# Patient Record
Sex: MALE | Race: WHITE | Employment: FULL TIME | URBAN - METROPOLITAN AREA
[De-identification: names, ages, dates, MRNs, and addresses within clinical notes are randomized per-mention and may not be internally consistent; named-entity substitution may affect disease eponyms.]

---

## 2020-06-24 ENCOUNTER — HOSPITAL ENCOUNTER (OUTPATIENT)
Dept: PREADMISSION TESTING | Age: 55
Discharge: HOME | End: 2020-06-24
Payer: COMMERCIAL

## 2020-06-24 PROCEDURE — 87635 SARS-COV-2 COVID-19 AMP PRB: CPT

## 2020-06-25 LAB — SARS-COV-2, COV2NT: NOT DETECTED

## 2020-06-29 ENCOUNTER — HOSPITAL ENCOUNTER (OUTPATIENT)
Age: 55
Setting detail: OUTPATIENT SURGERY
Discharge: HOME | End: 2020-06-29
Attending: INTERNAL MEDICINE | Admitting: INTERNAL MEDICINE
Payer: COMMERCIAL

## 2020-06-29 VITALS
TEMPERATURE: 96.3 F | RESPIRATION RATE: 16 BRPM | SYSTOLIC BLOOD PRESSURE: 120 MMHG | WEIGHT: 197.06 LBS | HEIGHT: 73 IN | HEART RATE: 56 BPM | OXYGEN SATURATION: 98 % | BODY MASS INDEX: 26.12 KG/M2 | DIASTOLIC BLOOD PRESSURE: 87 MMHG

## 2020-06-29 PROCEDURE — 76040000008: Performed by: INTERNAL MEDICINE

## 2020-06-29 PROCEDURE — 74011250636 HC RX REV CODE- 250/636: Performed by: INTERNAL MEDICINE

## 2020-06-29 PROCEDURE — 99153 MOD SED SAME PHYS/QHP EA: CPT | Performed by: INTERNAL MEDICINE

## 2020-06-29 PROCEDURE — G0500 MOD SEDAT ENDO SERVICE >5YRS: HCPCS | Performed by: INTERNAL MEDICINE

## 2020-06-29 PROCEDURE — 77030040361 HC SLV COMPR DVT MDII -B: Performed by: INTERNAL MEDICINE

## 2020-06-29 RX ORDER — SODIUM CHLORIDE 9 MG/ML
125 INJECTION, SOLUTION INTRAVENOUS CONTINUOUS
Status: DISCONTINUED | OUTPATIENT
Start: 2020-06-29 | End: 2020-06-29 | Stop reason: HOSPADM

## 2020-06-29 RX ORDER — CHOLECALCIFEROL (VITAMIN D3) 125 MCG
CAPSULE ORAL AS NEEDED
COMMUNITY

## 2020-06-29 RX ORDER — DIPHENHYDRAMINE HYDROCHLORIDE 50 MG/ML
50 INJECTION, SOLUTION INTRAMUSCULAR; INTRAVENOUS ONCE
Status: CANCELLED | OUTPATIENT
Start: 2020-06-29 | End: 2020-06-29

## 2020-06-29 RX ORDER — SODIUM CHLORIDE 0.9 % (FLUSH) 0.9 %
5-40 SYRINGE (ML) INJECTION EVERY 8 HOURS
Status: CANCELLED | OUTPATIENT
Start: 2020-06-29

## 2020-06-29 RX ORDER — DEXTROMETHORPHAN/PSEUDOEPHED 2.5-7.5/.8
1.2 DROPS ORAL
Status: CANCELLED | OUTPATIENT
Start: 2020-06-29

## 2020-06-29 RX ORDER — FLUMAZENIL 0.1 MG/ML
0.2 INJECTION INTRAVENOUS
Status: DISCONTINUED | OUTPATIENT
Start: 2020-06-29 | End: 2020-06-29 | Stop reason: HOSPADM

## 2020-06-29 RX ORDER — ATROPINE SULFATE 0.1 MG/ML
0.5 INJECTION INTRAVENOUS
Status: CANCELLED | OUTPATIENT
Start: 2020-06-29 | End: 2020-06-30

## 2020-06-29 RX ORDER — SODIUM CHLORIDE 0.9 % (FLUSH) 0.9 %
5-40 SYRINGE (ML) INJECTION AS NEEDED
Status: CANCELLED | OUTPATIENT
Start: 2020-06-29

## 2020-06-29 RX ORDER — SODIUM CHLORIDE 9 MG/ML
1000 INJECTION, SOLUTION INTRAVENOUS CONTINUOUS
Status: CANCELLED | OUTPATIENT
Start: 2020-06-29 | End: 2020-06-29

## 2020-06-29 RX ORDER — EPINEPHRINE 0.1 MG/ML
1 INJECTION INTRACARDIAC; INTRAVENOUS
Status: CANCELLED | OUTPATIENT
Start: 2020-06-29 | End: 2020-06-30

## 2020-06-29 RX ORDER — FENTANYL CITRATE 50 UG/ML
100 INJECTION, SOLUTION INTRAMUSCULAR; INTRAVENOUS
Status: DISCONTINUED | OUTPATIENT
Start: 2020-06-29 | End: 2020-06-29 | Stop reason: HOSPADM

## 2020-06-29 RX ORDER — ACETAMINOPHEN 500 MG
500 TABLET ORAL
COMMUNITY

## 2020-06-29 RX ORDER — NALOXONE HYDROCHLORIDE 0.4 MG/ML
0.4 INJECTION, SOLUTION INTRAMUSCULAR; INTRAVENOUS; SUBCUTANEOUS
Status: DISCONTINUED | OUTPATIENT
Start: 2020-06-29 | End: 2020-06-29 | Stop reason: HOSPADM

## 2020-06-29 RX ORDER — MIDAZOLAM HYDROCHLORIDE 1 MG/ML
.25-5 INJECTION, SOLUTION INTRAMUSCULAR; INTRAVENOUS
Status: DISCONTINUED | OUTPATIENT
Start: 2020-06-29 | End: 2020-06-29 | Stop reason: HOSPADM

## 2020-06-29 RX ADMIN — SODIUM CHLORIDE 125 ML/HR: 900 INJECTION, SOLUTION INTRAVENOUS at 08:01

## 2020-06-29 NOTE — DISCHARGE INSTRUCTIONS
Hilton Handley  [id number]  1965    COLON DISCHARGE INSTRUCTIONS    Discomfort:  Redness at IV site- apply warm compress to area; if redness or soreness persist- contact your physician  There may be a slight amount of blood passed from the rectum  Gaseous discomfort- walking, belching will help relieve any discomfort  You may not operate a vehicle til the next day. You may not engage in an occupation involving machinery or appliances til the next day. You may not drink alcoholic beverages til the next day. DIET:   High fiber diet. ACTIVITY:  You may not  resume your normal daily activities til the next day. it is recommended that you spend the remainder of the day resting -  avoid any strenuous activity. CALL M.D.  IF ANY SIGN OF:   Increasing pain, nausea, vomiting  Abdominal distension (swelling)  New increased bleeding (oral or rectal)  Fever (chills)  Pain in chest area  Bloody discharge from nose or mouth  Shortness of breath    You may  take any Advil, Aspirin, Ibuprofen, Motrin, Aleve, or Goodys but preferably Tylenol as needed for pain. Post procedure diagnosis:  REDUNDANT COLON; TORTUOUS COLON;    Follow-up Instructions: Your follow up colonoscopy will be in 10 years.       Argelia Sellers MD  June 29, 2020       DISCHARGE SUMMARY from Nurse    The following personal items collected during your admission are returned to you:   Dental Appliance: Dental Appliances: None  Vision: Visual Aid: Glasses  Hearing Aid:    Jewelry:    Clothing:    Other Valuables:    Valuables sent to safe:              PATIENT INSTRUCTIONS:    After general anesthesia or intravenous sedation, for 24 hours or while taking prescription Narcotics:  · Limit your activities  · Do not drive and operate hazardous machinery  · Do not make important personal or business decisions  · Do  not drink alcoholic beverages  · If you have not urinated within 8 hours after discharge, please contact your surgeon on call.    Report the following to your surgeon:  · Excessive pain, swelling, redness or odor of or around the surgical area  · Temperature over 100.5  · Nausea and vomiting lasting longer than 4 hours or if unable to take medications  · Any signs of decreased circulation or nerve impairment to extremity: change in color, persistent  numbness, tingling, coldness or increase pain  · Any questions      No orders of the defined types were placed in this encounter. What to do at Home:  Recommended activity: as above,     If you experience any of the following symptoms as above, please follow up with Dr. Noble Mayo. *  Please give a list of your current medications to your Primary Care Provider. *  Please update this list whenever your medications are discontinued, doses are      changed, or new medications (including over-the-counter products) are added. *  Please carry medication information at all times in case of emergency situations. These are general instructions for a healthy lifestyle:    No smoking/ No tobacco products/ Avoid exposure to second hand smoke    Surgeon General's Warning:  Quitting smoking now greatly reduces serious risk to your health. Obesity, smoking, and sedentary lifestyle greatly increases your risk for illness    A healthy diet, regular physical exercise & weight monitoring are important for maintaining a healthy lifestyle    You may be retaining fluid if you have a history of heart failure or if you experience any of the following symptoms:  Weight gain of 3 pounds or more overnight or 5 pounds in a week, increased swelling in our hands or feet or shortness of breath while lying flat in bed. Please call your doctor as soon as you notice any of these symptoms; do not wait until your next office visit.     Recognize signs and symptoms of STROKE:    F-face looks uneven    A-arms unable to move or move unevenly    S-speech slurred or non-existent    T-time-call 911 as soon as signs and symptoms begin-DO NOT go       Back to bed or wait to see if you get better-TIME IS BRAIN. The discharge information has been reviewed with the patient and spouse. The patient and spouse verbalized understanding. Warning Signs of HEART ATTACK     Call 911 if you have these symptoms:   Chest discomfort. Most heart attacks involve discomfort in the center of the chest that lasts more than a few minutes, or that goes away and comes back. It can feel like uncomfortable pressure, squeezing, fullness, or pain.  Discomfort in other areas of the upper body. Symptoms can include pain or discomfort in one or both arms, the back, neck, jaw, or stomach.  Shortness of breath with or without chest discomfort.  Other signs may include breaking out in a cold sweat, nausea, or lightheadedness. Don't wait more than five minutes to call 911 - MINUTES MATTER! Fast action can save your life. Calling 911 is almost always the fastest way to get lifesaving treatment. Emergency Medical Services staff can begin treatment when they arrive -- up to an hour sooner than if someone gets to the hospital by car. The discharge information has been reviewed with the patient and caregiver. The patient and caregiver verbalized understanding. Discharge medications reviewed with the patient and guardian and appropriate educational materials and side effects teaching were provided.     Patient armband removed and shredded

## 2020-06-29 NOTE — PROCEDURES
[facility]  Colonoscopy Procedure Report  _______________________________________________________  Patient: Romina Mcmahon                                        Attending Physician: Pedrito Sesay MD    Patient ID: [id number]                                    Referring Physician: Alida Rodriguez MD    Exam Date: 6/29/2020      Introduction: A  62 y.o. male patient, presents for inpatient Colonoscopy    Indications: patient of Dr. Hui Boyd  for colonoscopy. Last colonoscopy completed  12/1/15  by my self: tortuous sigmoid  with fair bowel prep, small 5mm sessile polyp in the descending colon at 40cm cold snared. ( descending colon polypectomy) (insufficient material for histologic study). BM every other day. Medical hx unremarkable. Surgical hx remarkable for right meniscus repair No family history of colorectal CA. Consent: The benefits, risks, and alternatives to the procedure were discussed and informed consent was obtained from the patient. Preparation: EKG, pulse, pulse oximetry and blood pressure were monitored throughout the procedure. ASA Classification: Class I- . The heart is an S1-S2 and regular heart rate and rhythm. Lungs are clear to auscultation and percussion. Abdomen is soft, nondistended, and nontender. Mental Status: awake, alert, and oriented to person, place, and time    Medications:  · Fentanyl 200 mcg IV and Versed 5 mg IV throughout the procedure. Rectal Exam: Normal Rectal Exam. No Blood. Prostate not enlarged. Pathology Specimens:  0    Procedure: The colonoscope was passed with ease through the anus under direct visualization and advanced to the cecum. The patient required positioning on the back to aid in the passage of the scope. The scope was withdrawn and the mucosa was carefully examined. The quality of the preparation was excellent. The views were excellent. The patient's toleration of the procedure was excellent.  Retroflexion made in the ascending colon.The exam was done twice to the cecum. Total time is 27 minutes and withdrawal time is 14 minutes. Findings:    Rectum:   Small internal hemorrhoids. Sigmoid:   Very loopy and tortuous sigmoid colon  Descending Colon:   Normal   Transverse Colon:   Normal   Ascending Colon:   Normal   Cecum:   Normal   Terminal Ileum:   Normal      Unplanned Events: There were no unplanned events. Estimated Blood Loss: None  Impressions: Small internal hemorrhoids. Difficult colonoscopy caused by a Very loopy and tortuous sigmoid colon. Normal Mucosa. No blood, polyps, diverticula or AVM found. Complications: None; patient tolerated the procedure well. Recommendations:  · Discharge home when standard parameters are met. · Resume a high fiber diet. · Resume own medications. · Colonoscopy recommendation in 10 years.   · Take Miralax and/ or Colace 100 mg on regular basis if constipated    Procedure Codes:    · COLONOSCOPY [[id number] (Type: Custom)]    Endoscope Information:  Model Number(s)    [id number]   Assistant: None  Signed By: Thelma Zabala MD Date: 6/29/2020

## 2020-06-29 NOTE — H&P
Assessment/Plan  # Detail Type Description    1. Assessment Personal history of colonic polyps (Z86.010). Patient Plan  62year old  male  patient of Dr. Lyle Ledesma  for colonoscopy. Last colonoscopy completed  12/1/15  by Dr. Apolinar Menezes and pathology revealed tortuous sigmoid  with fair bowel prep, small 5mm sessile polyp in the descending colon at 40cm cold snared. ( descending colon polypectomy) (insufficient material for histologic study). BM every other day. Normal color, soft, formed in consistency. No evidence of blood or mucus, changes in bowel pattern or constipation issues. Patient reports no allergies or herbal consumption. Medical hx unremarkable. No significant neuro, cardiac, pulmonary, GI, , renal, hepatic, musculoskeletal, or endocrine issues. Surgical hx remarkable for right meniscus repair No family history of colorectal CA. Denies tobacco and occasional ETOH use. No significant weight gains or losses in the last 3-6 months. No heat or cold intolerances. Patient states  no N/V/D, fever, chills, sick contacts, SOB, abdominal or chest pains. No dysphagia, appetite is good which consists of 3 meals per day. PLAN: Colonoscopy Scheduled     He has average risk for colon cancer and asymptomatic. He would be having his surveillance colonoscopy. I explained to him the procedure of colonoscopy and the risks involved which include but not limited to reaction to sedation, bleeding, perforation, infection or missing a lesion if bowels are not well prepped or are unusually tortuous. He agreed to proceed with the procedure and answered his questions. thoroughly. I gave him the approved prep  to clean his bowels. I advised him to take if needed, extra laxatives for few days before in the event he is on the constipated side to assure adequate bowel prep.    Told him not take his medications in the morning of the procedure because they would be flushed out with the prep but he can take them more confidently after the procedure. I advised him to bring all his medication with him. This 62year old male presents for Hx polyp/colon cancer. History of Present Illness:  1. Hx polyp/colon cancer   Prior screening:  colonoscopy. Denies risk factors. Pertinent negatives include abdominal pain, change in bowel habits, change in stool caliber, constipation, decreased appetite, diarrhea, melena, nausea, rectal bleeding, vomiting, weight gain and weight loss. Additional information: No family history of colon cancer, No family history of Crohn's/colitis and No NSAID/ASA use. PROBLEM LIST:     Problem Description Onset Date Chronic Clinical Status Notes   H/O: urinary stone 2015 Y               PAST MEDICAL/SURGICAL HISTORY   (Detailed)    Disease/disorder Onset Date Management Date Comments     knee arthroscopy  Melvin Matat 2015 -         Family History  (Detailed)  Relationship Family Member Name  Age at Death Condition Onset Age Cause of Death       No family history of Asthma  N       No family history of Diabetes mellitus type 2  N       No family history of Depression  N       No family history of Anxiety  N       No family history of Renal disease  N       No family history of Coronary artery disease  N       No family history of Hyperlipidemia  N       No family history of Liver disease  N   Father    Dementia  N   Father    Hypertension  N   Mother    Stroke  N         Social History:  (Detailed)  Tobacco use reviewed. The patient is right-handed. Preferred language is [place]. EDUCATION/EMPLOYMENT/OCCUPATION  The patient has a(n) some college education. Earned an Associate's degree. Employment History Status Retired Restrictions            MARITAL STATUS/FAMILY/SOCIAL SUPPORT  Marital status:    CHILDREN  Has children:  Tobacco use status: Non-smoker. Smoking status: Never smoker.     TOBACCO SCREENING:  Patient has never used tobacco. Patient has not used tobacco in the last 30 days. Patient has not used smokeless tobacco in the last 30 days. SMOKING STATUS  Type Smoking Status Usage Per Day Years Used Pack Years Total Pack Years    Never smoker         TOBACCO/VAPING EXPOSURE  No passive smoke exposure. ALCOHOL  There is a history of alcohol use. Type: Hard liquor. 4 drinks consumed weekly. CAFFEINE  The patient uses caffeine: soda. LIFESTYLE  Exercise includes walking. Hobbies include golf. Medications (active prior to today)  Medication Name Sig Description Start Date Stop Date Refilled Rx Elsewhere   MetroCream 0.75 % topical apply by topical route  every day a thin layer to the affected area(s) in the morning and evening 10/29/2018   N     Patient Status   Completed with information received for patient in a summary of care record. Medication Reconciliation  Medications reconciled today. Medication Reviewed  Adherence Medication Name Sig Desc Elsewhere Status   taking as directed MetroCream 0.75 % topical apply by topical route  every day a thin layer to the affected area(s) in the morning and evening N Verified   taking as directed Suprep Bowel Prep Kit 17.5 gram-3.13 gram-1.6 gram oral solution Take as prescribed by physician N Verified     Medications (Added, Continued or Stopped today)  Start Date Medication Directions PRN Status PRN Reason Instruction Stop Date   10/29/2018 MetroCream 0.75 % topical apply by topical route  every day a thin layer to the affected area(s) in the morning and evening N      02/27/2020 Suprep Bowel Prep Kit 17.5 gram-3.13 gram-1.6 gram oral solution Take as prescribed by physician N        Allergies:  Ingredient Reaction (Severity) Medication Name Comment   NO KNOWN ALLERGIES      Reviewed, no changes. Review of System  System Neg/Pos Details   Constitutional Negative Chills, Fever, Malaise, Weight gain and Weight loss.    ENMT Negative Ear infections, Nasal congestion, Sinus Infection and Sore throat. Eyes Negative Double vision and Eye pain. Respiratory Negative Asthma, Chronic cough, Dyspnea, Pleuritic pain and Wheezing. Cardio Negative Chest pain, Edema and Irregular heartbeat/palpitations. GI Negative Abdominal pain, Change in bowel habits, Change in stool caliber, Constipation, Decreased appetite, Diarrhea, Dysphagia, Heartburn, Hematemesis, Hematochezia, Melena, Nausea, Rectal bleeding, Reflux and Vomiting.  Negative Dysuria, Hematuria, Urinary frequency, Urinary incontinence and Urinary retention. Endocrine Negative Cold intolerance, Gynecomastia, Heat intolerance and Increased thirst.   Neuro Negative Dizziness, Headache, Numbness, Tremors and Vertigo. Psych Negative Anxiety, Depression and Increased stress. Integumentary Negative Hives, Pruritus and Rash. MS Negative Back pain, Joint pain and Myalgia. Hema/Lymph Negative Easy bleeding, Easy bruising and Lymphadenopathy. Allergic/Immuno Negative Chemicals in work place, Contact allergy, Food allergies, Immunosuppression and Seasonal allergies. Reproductive Negative Penile discharge and Sexual dysfunction. Vital Signs   Height  Time ft in cm Last Measured Height Position   10:43 AM 6.0 1.00 185.42 02/27/2020 0     Date/Time Temp Pulse BP MAP (Calculated) Arterial Line 1 BP (mmHg) BP Patient Position Resp SpO2 O2 Device O2 Flow Rate (L/min) Pre/Post Ductal Weight   06/29/20 0850 -- 58Abnormal  128/84 99 -- -- 15 98 % Room air -- -- --   06/29/20 0845 -- 59Abnormal  136/79 98 -- -- 13 99 % Room air -- -- --   06/29/20 0840 -- 60 139/83 102 -- -- 14 100 % Room air -- -- --   06/29/20 0746 97.6 °F (36.4 °C) 65 104/72 83 -- -- 16 100 % Room air -- -- 89.4 kg (197 lb 1 oz)         PHYSICAL EXAM:  Exam Findings Details   Constitutional Normal Well developed.    Eyes Normal Conjunctiva - Right: Normal, Left: Normal. Sclera - Right: Normal, Left: Normal.   Nasopharynx Normal Lips/teeth/gums - Normal. Buccal mucosa - Normal.   Neck Exam Normal Inspection - Normal. Palpation - Normal. Thyroid gland - Normal.   Respiratory Normal Inspection - Normal. Auscultation - Normal.   Cardiovascular Normal Regular rate and rhythm. No murmurs, gallops, or rubs. Vascular Normal Pulses - Radial: Normal, Brachial: Normal, Dorsalis pedis: Normal, Posterior tibial: Normal.   Abdomen Normal Inspection - Normal. Appliance(s) - None. Abdominal muscles - Normal. Auscultation - Normal. Percussion - Normal. Anterior palpation - Normal, No guarding. Umbilicus - Normal. No abdominal tenderness. No hepatic enlargement. No spleen enlargement. No hernia. No ascites. Ellison's sign - Negative. No hepatic tenderness. No hepatic bruit. Skin Normal Inspection - Normal.   Musculoskeletal Normal Hands/Wrist - Right: Normal, Left: Normal.   Extremity Normal No edema. Neurological Normal Fine motor skills - Normal.   Psychiatric Normal Orientation - Oriented to time, place, person & situation. Appropriate mood and affect.      No change in H&P

## (undated) DEVICE — SYRINGE 50ML E/T

## (undated) DEVICE — GARMENT,MEDLINE,DVT,INT,CALF,MED, GEN2: Brand: MEDLINE

## (undated) DEVICE — CATH SUC CTRL PRT TRIFLO 14FR --

## (undated) DEVICE — SPONGE GZ W4XL4IN COT 12 PLY TYP VII WVN C FLD DSGN

## (undated) DEVICE — TRAP SPEC COLL POLYP POLYSTYR --

## (undated) DEVICE — WRISTBAND ID AD W2.5XL9.5CM RED VYN ADH CLSR UNI-PRINT

## (undated) DEVICE — SOLUTION IV 500ML 0.9% SOD CHL FLX CONT

## (undated) DEVICE — KENDALL RADIOLUCENT FOAM MONITORING ELECTRODE RECTANGULAR SHAPE: Brand: KENDALL

## (undated) DEVICE — CANNULA CUSH AD W/ 14FT TBG

## (undated) DEVICE — ENDO CARRY-ON PROCEDURE KIT INCLUDES ENZYMATIC SPONGE, GAUZE, BIOHAZARD LABEL, TRAY, LUBRICANT, DIRTY SCOPE LABEL, WATER LABEL, TRAY, DRAWSTRING PAD, AND DEFENDO 4-PIECE KIT.: Brand: ENDO CARRY-ON PROCEDURE KIT

## (undated) DEVICE — SINGLE PORT MANIFOLD: Brand: NEPTUNE 2

## (undated) DEVICE — NDL FLTR TIP 5 MIC 18GX1.5IN --

## (undated) DEVICE — SYR 5ML 1/5 GRAD LL NSAF LF --

## (undated) DEVICE — SYR 3ML LL TIP 1/10ML GRAD --

## (undated) DEVICE — MEDI-VAC NON-CONDUCTIVE SUCTION TUBING: Brand: CARDINAL HEALTH

## (undated) DEVICE — TRNQT TEXT 1X18IN BLU LF DISP -- CONVERT TO ITEM 362165

## (undated) DEVICE — SPONGE GZ W4XL4IN RAYON POLY 4 PLY NONWOVEN FASTER WICKING

## (undated) DEVICE — MAJ-1414 SINGLE USE ADPATER BIOPSY VALV: Brand: SINGLE USE ADAPTOR BIOPSY VALVE

## (undated) DEVICE — SET ADMIN 16ML TBNG L100IN 2 Y INJ SITE IV PIGGY BK DISP

## (undated) DEVICE — NDL PRT INJ NSAF BLNT 18GX1.5 --

## (undated) DEVICE — CATH IV SAFE STR 22GX1IN BLU -- PROTECTIV PLUS